# Patient Record
Sex: FEMALE | Race: WHITE
[De-identification: names, ages, dates, MRNs, and addresses within clinical notes are randomized per-mention and may not be internally consistent; named-entity substitution may affect disease eponyms.]

---

## 2018-04-17 ENCOUNTER — HOSPITAL ENCOUNTER (EMERGENCY)
Dept: HOSPITAL 45 - C.EDB | Age: 33
Discharge: HOME | End: 2018-04-17
Payer: COMMERCIAL

## 2018-04-17 VITALS
HEIGHT: 69.02 IN | BODY MASS INDEX: 33.5 KG/M2 | BODY MASS INDEX: 33.5 KG/M2 | HEIGHT: 69.02 IN | WEIGHT: 226.19 LBS | WEIGHT: 226.19 LBS

## 2018-04-17 VITALS — OXYGEN SATURATION: 97 % | DIASTOLIC BLOOD PRESSURE: 78 MMHG | HEART RATE: 99 BPM | SYSTOLIC BLOOD PRESSURE: 138 MMHG

## 2018-04-17 VITALS — TEMPERATURE: 98.24 F

## 2018-04-17 DIAGNOSIS — J02.9: Primary | ICD-10-CM

## 2018-04-17 DIAGNOSIS — Z87.891: ICD-10-CM

## 2018-04-17 NOTE — EMERGENCY ROOM VISIT NOTE
History


First contact with patient:  09:48


Chief Complaint:  SORETHROAT


Stated Complaint:  STREP





History of Present Illness


The patient is a 32 year old female who presents to the Emergency Room with 

complaints of "sore throat".  The patient states that she has been experiencing 

a sore throat since Saturday night.  She states that she was seen by care site 

yesterday and had a negative rapid strep and when she woke up she felt worse 

and she has had fevers up to 102F orally.    She states that she now has 

tonsillar exudate.  She rates the overall pain she is still able to tolerate 

fluids and food.  She has no trouble breathing.  She rates the pain as a 6/10.  

She states that there is also body aches.  No dysphasia.  There is a productive 

cough with brown sputum.  No shortness of breath, chest pain, pleuritic pain, 

sinus pressure, otalgia, weakness, recent sick contacts.





Review of Systems


A complete 6-point Review of Systems was discussed with the patient, with 

pertinent positives and negatives listed in the History of Present Illness. All 

remaining Review of Systems questions can be considered negative unless 

otherwise specified.





Past Medical/Surgical History


Medical Problems:


(1) Flank pain


(2) Hematuria


(3) Intrauterine pregnancy


(4) Kidney stone


(5) No pertinent past medical history


(6) UTI (urinary tract infection)


(7) Vaginal delivery








Family History





Patient reports no known family medical history.





Social History


Smoking Status:  Former Smoker


Alcohol Use:  none


Marital Status:  


Occupation Status:  employed





Current/Historical Medications


Scheduled


Acetaminophen Tab (Tylenol), 325 MG PO DAILY


Amoxicillin & Pot Clavulanate (Augmentin 875-125 mg), 1 TAB PO BID


Prednisone (Prednisone Tab), 2 TAB PO DAILY


Tamsulosin HCl (Tamsulosin HCl), 0.4 MG PO HS





Physical Exam


Vital Signs











  Date Time  Temp Pulse Resp B/P (MAP) Pulse Ox O2 Delivery O2 Flow Rate FiO2


 


4/17/18 10:35  99 18 138/78 97   


 


4/17/18 09:43     96 Room Air  


 


4/17/18 09:43 36.8 109 18 147/77 96 Room Air  











Physical Exam


VITAL SIGNS - Vital signs and nursing notes were reviewed.  Stable.  Afebrile.


GENERAL -32-year-old female appearing her stated age who is in no acute 

distress. Communicates well with provider and answers questions appropriately.


SKIN - Without rashes.  No meningeal or petechial rash.


HEAD - NC/AT.


EYES - PERRL with EOMI bilaterally. Sclera anicteric. 


EARS - No deformities of external structures noted on gross examination 

bilaterally.  External auditory canals without discharge or otorrhea. Tympanic 

membranes pearly gray without retraction or bulging. No fluid or purulent 

material visualized behind the TM. Handle of malleus, umbo, cone of light, pars 

tensa/flaccid all easily visualized.


NOSE - Midline and without cyanosis. No epistaxis or purulent drainage noted. 


MOUTH/OROPHARYNX - Without perioral cyanosis. Buccal mucosa pink and moist and 

without leukoplakia.  Tongue is midline.  Uvula midline.  There is 2+ tonsillar 

hypertrophy bilaterally with white exudate.  Airway is patent.


NECK - Neck with FROM. Supple to palpation.  Bilateral anterior cervical 

lymphadenopathy noted. No nuchal rigidity.





Medical Decision & Procedures


Medical Decision


Patient was seen and evaluated as above in room A2. Review was performed of 

nursing notes and vital signs. After obtaining a thorough history and physical 

examination the above work up was performed.  She presents to us today with a 

sore throat.  She does meet 3 of the 4 Cent[facility] criteria.  Rapid strep was 

negative with culture pending.  I will treat for suspected streptococcal 

pharyngitis.  Although certainly is possible this is still viral in etiology 

given her presentation will elect to treat with antibiotics.  She will be given 

Augmentin and prednisone.  She is to follow with the family doctor or return 

with worsening.  Vital signs are stable.  The patient was educated upon 

management, had questions answered prior to discharge, and was discharged home 

in good condition.





In the evaluation and treatment of this patient the following differential 

diagnoses were entertained: Streptococcal pharyngitis, peritonsillar abscess, 

mononucleosis, viral illness, among others.





Impression





 Primary Impression:  


 Sore throat





Departure Information


Dispostion


Home / Self-Care





Condition


GOOD





Prescriptions





Prednisone (Prednisone Tab) 20 Mg Tab


2 TAB PO DAILY for 5 Days, #10 TAB


   Prov: Fred Jones PA-C         4/17/18 


Amoxicillin & Pot Clavulanate (Augmentin 875-125 mg) 1 Tab Tab


1 TAB PO BID for 10 Days, #20 TAB


   Prov: Fred Jones PA-C         4/17/18





Referrals


Eliazar Ponce M.D. (PCP)





Patient Instructions


My [facility]





Additional Instructions








You were seen in the emergency department for your sore throat. The results of 

your rapid strep screen were found to be negative. 





You were prescribed Augmentin to be taken every 12 hours. This is an 

antibiotic. All antibiotics have the potential to cause diarrhea. Stop this 

medication and contact a medical provider if you were to develop any 

significant adverse side effects including: wheezing, shortness of breath, 

passing out, vomiting, or a diffuse rash. Always take antibiotics as directed 

and COMPLETE the ENTIRE course regardless of the improvement of your symptoms.





You have been prescribed Prednisone 40 mg to be taken orally once a day for the 

next 5 days. This is an anti-inflammatory medicine to be used to help minimize 

your symptoms. You should take the COMPLETE  course of the medication.





For pain and fever control, you can use the following over-the-counter 

medicines (if >11 yo):





- Regular strength (325mg/tab) Tylenol (acetaminophen) 2 tabs every 4-6 hours 

as needed. Do not exceed 12 tablets in a 24 hour period. Avoid taking more than 

3 grams (3000 mg) of Tylenol per day. This includes any other sources of 

acetaminophen you may take on a regular basis.





- Regular strength (200 mg/tab) Advil (ibuprofen) 1-2 tabs every 4-6 hours as 

needed. Do not exceed a dose of 3200 mg per day.


 


- For best results, alternate dosing of Tylenol and Advil.





In addition to your prescribed medications, you can also use the following home 

remedies:





- Warm salt-water gargles 3 times per day can soothe your throat and help to 

fight infection.





- Warm tea with honey can soothe your throat.





Return to the emergency department if your symptoms persist or worsen over the 

next 2-3 days despite treatment course outlined above. Return to the emergency 

department if you develop the following symptoms of: inability to swallow solids

, liquids, or drool; excessive wheezing or inability to catch your breath; or 

intractable fever or pain.





Follow up with your primary care provider in 2-3 days from today's emergency 

department visit.